# Patient Record
Sex: MALE | Race: WHITE
[De-identification: names, ages, dates, MRNs, and addresses within clinical notes are randomized per-mention and may not be internally consistent; named-entity substitution may affect disease eponyms.]

---

## 2018-09-16 ENCOUNTER — HOSPITAL ENCOUNTER (EMERGENCY)
Dept: HOSPITAL 75 - ER | Age: 23
Discharge: LEFT BEFORE DISCHARGE | End: 2018-09-16
Payer: SELF-PAY

## 2018-09-16 DIAGNOSIS — R45.851: ICD-10-CM

## 2018-09-16 DIAGNOSIS — F10.10: Primary | ICD-10-CM

## 2018-09-16 NOTE — ED PSYCHOSOCIAL
General


Stated Complaint:  SUICIDAL/ETOH


Source:  patient


Exam Limitations:  intoxication





History of Present Illness


Date Seen by Provider:  Sep 16, 2018


Time Seen by Provider:  02:12





Past Medical-Social-Family Hx


Patient Social History


Recent Foreign Travel:  No


Contact w/Someone Who Travel:  No





Physical Exam


Capillary Refill :


Height, Weight, BMI


Height: '"


Weight: lbs. oz. kg;  BMI


Method:





Departure


Departure-Patient Inst.


Referrals:  


NO,LOCAL PHYSICIAN (PCP/Family)


Primary Care Physician











ARMANI JUAREZ Sep 16, 2018 02:14